# Patient Record
Sex: MALE | Race: WHITE
[De-identification: names, ages, dates, MRNs, and addresses within clinical notes are randomized per-mention and may not be internally consistent; named-entity substitution may affect disease eponyms.]

---

## 2018-05-09 ENCOUNTER — HOSPITAL ENCOUNTER (EMERGENCY)
Dept: HOSPITAL 75 - ER | Age: 68
Discharge: HOME | End: 2018-05-09
Payer: MEDICARE

## 2018-05-09 VITALS — WEIGHT: 265 LBS | BODY MASS INDEX: 37.1 KG/M2 | HEIGHT: 71 IN

## 2018-05-09 VITALS — DIASTOLIC BLOOD PRESSURE: 102 MMHG | SYSTOLIC BLOOD PRESSURE: 134 MMHG

## 2018-05-09 DIAGNOSIS — L03.012: Primary | ICD-10-CM

## 2018-05-09 DIAGNOSIS — I10: ICD-10-CM

## 2018-05-09 DIAGNOSIS — E11.9: ICD-10-CM

## 2018-05-09 DIAGNOSIS — Z98.890: ICD-10-CM

## 2018-05-09 PROCEDURE — 87070 CULTURE OTHR SPECIMN AEROBIC: CPT

## 2018-05-09 PROCEDURE — 87077 CULTURE AEROBIC IDENTIFY: CPT

## 2018-05-09 PROCEDURE — 87186 SC STD MICRODIL/AGAR DIL: CPT

## 2018-05-09 PROCEDURE — 87205 SMEAR GRAM STAIN: CPT

## 2018-05-09 NOTE — ED UPPER EXTREMITY
General


Chief Complaint:  Upper Extremity


Stated Complaint:  THUMB HURT FEELS INFECTED


Nursing Triage Note:  


ARRIVED VIA AMB TO ROOM 05.  COMPLAINS OF POSSIBLE LEFT THUMB INFECTION AFTER 


HAVINE A SLIVER OF WOOD IN IT.


Nursing Sepsis Screen:  No Definite Risk


Source:  patient


Exam Limitations:  no limitations





History of Present Illness


Date Seen by Provider:  May 9, 2018


Time Seen by Provider:  10:21


Initial Comments


Here with injury of the left thumb with concern of infection.  This involves 

the ulnar side of the thumb along the edge of the nailbed.  Onset after getting 

a splinter in that area a few days ago.  He remove what.  He has been using over

-the-counter wound and dressing but it has not worked to keep infection from 

happening apparently.  He is diabetic.  No red streaks or fever reported.


Onset:  other (3 days ago)


Severity:  mild


Pain/Injury Location:  left thumb


Method of Injury:  other (splinter)


Modifying Factors:  Improves With Rest





Allergies and Home Medications


Allergies


Coded Allergies:  


     NKANo Known Allergies (Unverified  Allergy, Mild, 1/15/10)





Home Medications


Ceftriaxone Na/Dextrose,Iso 1 Gm/50 Ml Froz.piggy, 1 GM IV DAILY


   Prescribed by: TORREY RIOS on 8/17/16 0738


Oxycodone HCl/Acetaminophen 1 Each Tablet, 1 TAB PO Q6H PRN for PAIN, (Reported)


Sildenafil Citrate 100 Mg Tablet, 100 MG PO DAILY PRN for MIGRAINE, (Reported)





Patient Home Medication List


Home Medication List Reviewed:  Yes





Constitutional:  no symptoms reported; No chills, No fever


Respiratory:  no symptoms reported; No short of breath, No wheezing


Cardiovascular:  no symptoms reported; No chest pain, No edema


Musculoskeletal:  No muscle pain, No muscle stiffness


Skin:  see HPI, change in color, lesions


All Other Systems Reviewed


Negative Unless Noted:  No





Past Medical-Social-Family Hx


Past Med/Social Hx:  Reviewed Nursing Past Med/Soc Hx


Patient Social History


Alcohol Use:  Occasionally Uses


Alcohol Beverage of Choice:  Whiskey, [place]


Recreational Drug Use:  No


Smoking Status:  Never a Smoker


Recent Foreign Travel:  No


Contact w/Someone Who Travel:  No


Recent Infectious Disease Expo:  No


Recent Hopitalizations:  No





Immunizations Up To Date


Tetanus Booster (TDap):  Less than 5yrs


PED Vaccines UTD:  Yes


Date of Pneumonia Vaccine:  Oct 3, 2011





Seasonal Allergies


Seasonal Allergies:  No





Past Medical History


Surgeries:  Yes (BACK SURGERY, NECK SURGERY,PIN IN LEFT HAND. BILATERAL KNEE 

SCOPES)


Orthopedic


Respiratory:  No


Currently Using CPAP:  No


Currently Using BIPAP:  No


Cardiac:  Yes


Hypertension


Neurological:  No


Reproductive Disorders:  No


Sexually Transmitted Disease:  No


HIV/AIDS:  No


Gastrointestinal:  No


Musculoskeletal:  No


Back Injury, Chronic Back Pain


Endocrine:  Yes


Diabetes, Non-Insulin dep


Loss of Vision:  Denies


Hearing Impairment:  Denies


Cancer:  No


Psychosocial:  No


Integumentary:  No


Recent Skin Changes


Blood Disorders:  No


Adverse Reaction/Blood Tranf:  No





Family Medical History


Reviewed Nursing Family Hx





Patient reports no known family medical history.


No Pertinent Family Hx





Physical Exam


Vital Signs





Vital Signs - First Documented








 5/9/18





 09:50


 


Temp 99.4


 


Pulse 119


 


Resp 18


 


B/P (MAP) 160/102 (121)


 


Pulse Ox 96


 


O2 Delivery Room Air





Capillary Refill : Less Than 3 Seconds


General Appearance:  WD/WN, no apparent distress


Cardiovascular:  regular rate, rhythm, no murmur


Respiratory:  lungs clear, normal breath sounds, no respiratory distress


Hand:  Left, soft tissue tenderness, swelling (paronychial type infection to 

the ulnar side of the left thumb with small amount of fluctuance noted in that 

area.)


Neurologic/Psychiatric:  alert, oriented x 3


Skin:  warm/dry, other (.  Trachea findings with redness and fluctuance noted 

to the ulnar side of the left thumb)





Progress/Results/Core Measures


Results/Orders


My Orders





Orders - BEATRIZ MATHEW MD


Wound Culture (5/9/18 10:35)


Lidocaine 1% Inj 50 Ml (Xylocaine 1% Inj (5/9/18 10:54)





Vital Signs/I&O











 5/9/18





 09:50


 


Temp 99.4


 


Pulse 119


 


Resp 18


 


B/P (MAP) 160/102 (121)


 


Pulse Ox 96


 


O2 Delivery Room Air














Blood Pressure Mean:  121











Progress


Progress Note :  


Progress Note


Seen and evaluated.  We will do I&D of the paronychia.  1100: Patient's wound 

spontaneously opened so I&D not done.  I did get wound culture.  Wound cleaned 

and covered with antibiotic ointment and Band-Aid.  Review of previous records 

show patient has history of staph aureus on the other thumb so we will initiate 

treatment with Bactrim DS.  Discharged home with return precautions.  Patient 

verbalize understanding instructions and agreement with plan.





Departure


Impression





 Primary Impression:  


 Paronychia of finger of left hand


Disposition:  01 HOME, SELF-CARE


Condition:  Improved





Departure-Patient Inst.


Decision time for Depature:  11:13


Referrals:  


KATRINA RIOS MD (PCP/Family)


Primary Care Physician


Patient Instructions:  Herman (BRODY)





Add. Discharge Instructions:  


All discharge instructions reviewed with patient and/or family. Voiced 

understanding.





Take medications as directed.  Use antibiotic ointment over wound twice daily 

for the next several days and then as needed.  Return for worse pain, fever, 

vomiting, red streaks up the hand or other concerns as needed.


Scripts


Mupirocin (Mupirocin) 22 Gm Oint...g.


0.5 GM TP BID, #1 TUBE 1 Refill


   Prov: BEATRIZ MATHEW MD         5/9/18 


Sulfamethoxazole/Trimethoprim (Sulfamethoxazole-Tmp Ds Tablet) 1 Each Tablet


1 EACH PO BID, #14 TAB 0 Refills


   Prov: BEATRIZ MATHEW MD         5/9/18











BEATRIZ MATHEW MD May 9, 2018 10:43

## 2021-07-29 ENCOUNTER — HOSPITAL ENCOUNTER (OUTPATIENT)
Dept: HOSPITAL 75 - RAD | Age: 71
End: 2021-07-29
Attending: FAMILY MEDICINE
Payer: MEDICARE

## 2021-07-29 DIAGNOSIS — Z98.890: ICD-10-CM

## 2021-07-29 DIAGNOSIS — Z98.1: ICD-10-CM

## 2021-07-29 DIAGNOSIS — M47.25: Primary | ICD-10-CM

## 2021-07-29 PROCEDURE — 72072 X-RAY EXAM THORAC SPINE 3VWS: CPT

## 2021-07-29 PROCEDURE — 72100 X-RAY EXAM L-S SPINE 2/3 VWS: CPT

## 2021-07-29 NOTE — DIAGNOSTIC IMAGING REPORT
INDICATION: Back pain



3 views of the thoracic spine shows mild levoscoliosis in the

upper thoracic spine. There is loss of the normal lordotic curve.

There is disc space narrowing and spondylosis at all levels but

no significant spinal canal encroachment is evident on these

views. No fracture is seen.



IMPRESSION: There are diffuse degenerative changes present with

no acute abnormality seen.



Dictated by: 



  Dictated on workstation # [id number]

## 2021-07-29 NOTE — DIAGNOSTIC IMAGING REPORT
INDICATION: Back pain



3 views of the lumbosacral spine shows changes of prior

laminectomy and fusion with bilateral pedicle screws and

stabilization rods seen from L2 through S1. There is overall

normal height and alignment of the vertebral bodies. There is at

least some disc space narrowing at all levels but is most

pronounced at L4-L5. There is spondylosis at all levels. There is

no abnormal erosions around the fixation hardware. There is no

fracture.



IMPRESSION: There is diffuse degenerative changes present. There

are changes of laminectomy and fusion with no acute abnormality

evident.



Dictated by: 



  Dictated on workstation # [id number]

## 2021-12-02 ENCOUNTER — HOSPITAL ENCOUNTER (OUTPATIENT)
Dept: HOSPITAL 75 - LABNPT | Age: 71
End: 2021-12-02
Attending: ORTHOPAEDIC SURGERY
Payer: MEDICARE

## 2021-12-02 DIAGNOSIS — Z53.9: ICD-10-CM

## 2021-12-02 DIAGNOSIS — Z01.812: Primary | ICD-10-CM

## 2021-12-27 ENCOUNTER — HOSPITAL ENCOUNTER (OUTPATIENT)
Dept: HOSPITAL 75 - LABNPT | Age: 71
End: 2021-12-27
Attending: ORTHOPAEDIC SURGERY

## 2021-12-27 DIAGNOSIS — Z20.822: ICD-10-CM

## 2021-12-27 DIAGNOSIS — Z01.812: Primary | ICD-10-CM

## 2021-12-27 PROCEDURE — 87635 SARS-COV-2 COVID-19 AMP PRB: CPT

## 2022-12-06 ENCOUNTER — HOSPITAL ENCOUNTER (OUTPATIENT)
Dept: HOSPITAL 75 - RAD | Age: 72
End: 2022-12-06
Attending: FAMILY MEDICINE
Payer: MEDICARE
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
## 2022-12-06 DIAGNOSIS — M79.661: Primary | ICD-10-CM

## 2022-12-06 NOTE — DIAGNOSTIC IMAGING REPORT
PROCEDURE: 

US right lower extremity venous.



TECHNIQUE: 

Multiple Real-time grayscale images were obtained over the right

lower extremity in various projections. Additional spectral

analysis and color Doppler duplex images were also obtained.



INDICATION: 

Right calf pain.



FINDINGS:

There is no evidence of right lower extremity DVT. The right

lower extremity deep venous system shows normal compressibility

with normal response to augmentation and Valsalva. No fluid

collection or mass is detected.



IMPRESSION: 

No evidence of right lower extremity DVT.



Dictated by: 



  Dictated on workstation # [id number]